# Patient Record
Sex: MALE | Race: WHITE | NOT HISPANIC OR LATINO | Employment: FULL TIME | URBAN - METROPOLITAN AREA
[De-identification: names, ages, dates, MRNs, and addresses within clinical notes are randomized per-mention and may not be internally consistent; named-entity substitution may affect disease eponyms.]

---

## 2018-07-08 ENCOUNTER — APPOINTMENT (EMERGENCY)
Dept: RADIOLOGY | Facility: HOSPITAL | Age: 53
End: 2018-07-08
Payer: COMMERCIAL

## 2018-07-08 ENCOUNTER — HOSPITAL ENCOUNTER (EMERGENCY)
Facility: HOSPITAL | Age: 53
Discharge: HOME | End: 2018-07-08
Attending: EMERGENCY MEDICINE | Admitting: EMERGENCY MEDICINE
Payer: COMMERCIAL

## 2018-07-08 VITALS
SYSTOLIC BLOOD PRESSURE: 143 MMHG | WEIGHT: 190 LBS | DIASTOLIC BLOOD PRESSURE: 69 MMHG | OXYGEN SATURATION: 94 % | HEIGHT: 69 IN | BODY MASS INDEX: 28.14 KG/M2 | TEMPERATURE: 98.5 F | HEART RATE: 82 BPM | RESPIRATION RATE: 18 BRPM

## 2018-07-08 DIAGNOSIS — S06.0X9A CONCUSSION: Primary | ICD-10-CM

## 2018-07-08 LAB
ALBUMIN SERPL BCP-MCNC: 3.5 G/DL (ref 3.5–5)
ALP SERPL-CCNC: 73 U/L (ref 46–116)
ALT SERPL W P-5'-P-CCNC: 53 U/L (ref 12–78)
AMMONIA PLAS-SCNC: 21 UMOL/L (ref 11–35)
ANION GAP SERPL CALCULATED.3IONS-SCNC: 6 MMOL/L (ref 4–13)
APTT PPP: 28 SECONDS (ref 24–36)
AST SERPL W P-5'-P-CCNC: 30 U/L (ref 5–45)
ATRIAL RATE: 75 BPM
BACTERIA UR QL AUTO: NORMAL /HPF
BASOPHILS # BLD AUTO: 0.06 THOUSANDS/ΜL (ref 0–0.1)
BASOPHILS NFR BLD AUTO: 1 % (ref 0–1)
BILIRUB SERPL-MCNC: 0.35 MG/DL (ref 0.2–1)
BILIRUB UR QL STRIP: NEGATIVE
BUN SERPL-MCNC: 7 MG/DL (ref 5–25)
CALCIUM SERPL-MCNC: 8.5 MG/DL (ref 8.3–10.1)
CHLORIDE SERPL-SCNC: 104 MMOL/L (ref 100–108)
CLARITY UR: CLEAR
CO2 SERPL-SCNC: 27 MMOL/L (ref 21–32)
COLOR UR: YELLOW
CREAT SERPL-MCNC: 0.6 MG/DL (ref 0.6–1.3)
EOSINOPHIL # BLD AUTO: 0.16 THOUSAND/ΜL (ref 0–0.61)
EOSINOPHIL NFR BLD AUTO: 2 % (ref 0–6)
ERYTHROCYTE [DISTWIDTH] IN BLOOD BY AUTOMATED COUNT: 12.9 % (ref 11.6–15.1)
ETHANOL SERPL-MCNC: 152 MG/DL (ref 0–3)
GFR SERPL CREATININE-BSD FRML MDRD: 113 ML/MIN/1.73SQ M
GLUCOSE SERPL-MCNC: 76 MG/DL (ref 65–140)
GLUCOSE UR STRIP-MCNC: NEGATIVE MG/DL
HCT VFR BLD AUTO: 48.9 % (ref 36.5–49.3)
HGB BLD-MCNC: 16.7 G/DL (ref 12–17)
HGB UR QL STRIP.AUTO: NEGATIVE
HYALINE CASTS #/AREA URNS LPF: NORMAL /LPF
IMM GRANULOCYTES # BLD AUTO: 0.02 THOUSAND/UL (ref 0–0.2)
IMM GRANULOCYTES NFR BLD AUTO: 0 % (ref 0–2)
INR PPP: 0.99 (ref 0.86–1.17)
KETONES UR STRIP-MCNC: NEGATIVE MG/DL
LEUKOCYTE ESTERASE UR QL STRIP: ABNORMAL
LYMPHOCYTES # BLD AUTO: 2.33 THOUSANDS/ΜL (ref 0.6–4.47)
LYMPHOCYTES NFR BLD AUTO: 26 % (ref 14–44)
MCH RBC QN AUTO: 31.6 PG (ref 26.8–34.3)
MCHC RBC AUTO-ENTMCNC: 34.2 G/DL (ref 31.4–37.4)
MCV RBC AUTO: 92 FL (ref 82–98)
MONOCYTES # BLD AUTO: 1 THOUSAND/ΜL (ref 0.17–1.22)
MONOCYTES NFR BLD AUTO: 11 % (ref 4–12)
NEUTROPHILS # BLD AUTO: 5.33 THOUSANDS/ΜL (ref 1.85–7.62)
NEUTS SEG NFR BLD AUTO: 60 % (ref 43–75)
NITRITE UR QL STRIP: NEGATIVE
NON-SQ EPI CELLS URNS QL MICRO: NORMAL /HPF
NRBC BLD AUTO-RTO: 0 /100 WBCS
P AXIS: 41 DEGREES
PH UR STRIP.AUTO: 5.5 [PH] (ref 4.5–8)
PLATELET # BLD AUTO: 252 THOUSANDS/UL (ref 149–390)
PMV BLD AUTO: 9.8 FL (ref 8.9–12.7)
POTASSIUM SERPL-SCNC: 3.6 MMOL/L (ref 3.5–5.3)
PR INTERVAL: 178 MS
PROT SERPL-MCNC: 7.1 G/DL (ref 6.4–8.2)
PROT UR STRIP-MCNC: NEGATIVE MG/DL
PROTHROMBIN TIME: 13.2 SECONDS (ref 11.8–14.2)
QRS AXIS: -42 DEGREES
QRSD INTERVAL: 94 MS
QT INTERVAL: 362 MS
QTC INTERVAL: 404 MS
RBC # BLD AUTO: 5.29 MILLION/UL (ref 3.88–5.62)
RBC #/AREA URNS AUTO: NORMAL /HPF
SODIUM SERPL-SCNC: 137 MMOL/L (ref 136–145)
SP GR UR STRIP.AUTO: 1.01 (ref 1–1.03)
T WAVE AXIS: 55 DEGREES
UROBILINOGEN UR QL STRIP.AUTO: 0.2 E.U./DL
VENTRICULAR RATE: 75 BPM
WBC # BLD AUTO: 8.9 THOUSAND/UL (ref 4.31–10.16)
WBC #/AREA URNS AUTO: NORMAL /HPF

## 2018-07-08 PROCEDURE — 85610 PROTHROMBIN TIME: CPT | Performed by: EMERGENCY MEDICINE

## 2018-07-08 PROCEDURE — 82140 ASSAY OF AMMONIA: CPT | Performed by: EMERGENCY MEDICINE

## 2018-07-08 PROCEDURE — 85730 THROMBOPLASTIN TIME PARTIAL: CPT | Performed by: EMERGENCY MEDICINE

## 2018-07-08 PROCEDURE — 80320 DRUG SCREEN QUANTALCOHOLS: CPT | Performed by: EMERGENCY MEDICINE

## 2018-07-08 PROCEDURE — 99284 EMERGENCY DEPT VISIT MOD MDM: CPT

## 2018-07-08 PROCEDURE — 93010 ELECTROCARDIOGRAM REPORT: CPT | Performed by: INTERNAL MEDICINE

## 2018-07-08 PROCEDURE — 85025 COMPLETE CBC W/AUTO DIFF WBC: CPT | Performed by: EMERGENCY MEDICINE

## 2018-07-08 PROCEDURE — 36415 COLL VENOUS BLD VENIPUNCTURE: CPT | Performed by: EMERGENCY MEDICINE

## 2018-07-08 PROCEDURE — 80053 COMPREHEN METABOLIC PANEL: CPT | Performed by: EMERGENCY MEDICINE

## 2018-07-08 PROCEDURE — 70450 CT HEAD/BRAIN W/O DYE: CPT

## 2018-07-08 PROCEDURE — 93005 ELECTROCARDIOGRAM TRACING: CPT

## 2018-07-08 PROCEDURE — 81001 URINALYSIS AUTO W/SCOPE: CPT | Performed by: EMERGENCY MEDICINE

## 2018-07-08 NOTE — ED ATTENDING ATTESTATION
Padilla Serrano MD, saw and evaluated the patient  I have discussed the patient with the resident/non-physician practitioner and agree with the resident's/non-physician practitioner's findings, Plan of Care, and MDM as documented in the resident's/non-physician practitioner's note, except where noted  All available labs and Radiology studies were reviewed  At this point I agree with the current assessment done in the Emergency Department  I have conducted an independent evaluation of this patient a history and physical is as follows:      Critical Care Time  CritCare Time    Procedures     51 yo male brought in by girlfriend for periods of confusion, altered ms, difficulty with gripping  Pt had head trauma and hit head on cabinet last week  Pt with no blood thinners  Pt does drink alcohol daily  No other trauma, no other drug use  No cp, no sob, no abdominal pain  Vss, afebrile, lungs cta, rrr, abdomen soft nontender, no neuro deficits  Ct head, labs  Urine

## 2018-07-08 NOTE — ED NOTES
Pt denies blood thinners  Pt states no daily medications  Denies blood thinners  Pt c/o headache since he hit his head last Thursday with associated dizziness and confusion        Leo Allen, ESTHER  07/08/18 4626

## 2018-07-08 NOTE — ED NOTES
Pt reports on 6/28/18 he walked into a piece of wood and hit the front of his head  Per pt's girlfriend pt has been increasingly confused since  Pt states it was unwitnessed and is unsure if he lost consciousness  Pt denies thinners and pt reports not being seen for this until now        Neli Mosqueda RN  07/08/18 8403

## 2018-07-08 NOTE — ED PROVIDER NOTES
History  Chief Complaint   Patient presents with    Head Injury     family states "patient hit his head last thursday " Since head injury patient becoming confused per significant other  52year old male with history alcohol use who presents with head injury  States he hit his head on a doorway on 6/28/2018  He does not remember if he lost consciousness, denies any other injuries at time of event  Patient now comes to ED with complaints of dizziness, headache, and head pressure which began with the head injury and have gradually worsened  Per girlfriend, he has also been exhibiting unusual behavior - he has been more agitated and aggressive, and has exhibited moments of confusion such as not remembering who the president is, or thinking he's in an old vehicle they no longer own  In general they both endorse that he has "not been himself " He states he has had some difficulty picking up small objects since the injury  He has high blood pressure but does not take any medication  He endorses drinking on average 6 beers/day, smokes 1 pack/day, and denies using any drugs  He denies aspirin or blood thinner use, and denies a history of any bleeding disorder  None       History reviewed  No pertinent past medical history  History reviewed  No pertinent surgical history  History reviewed  No pertinent family history  I have reviewed and agree with the history as documented  Social History   Substance Use Topics    Smoking status: Current Every Day Smoker    Smokeless tobacco: Never Used    Alcohol use Yes        Review of Systems   Constitutional: Positive for activity change  Negative for chills, diaphoresis and fever  HENT: Negative for congestion, drooling and sore throat  Eyes: Negative for photophobia and visual disturbance  Respiratory: Negative for chest tightness and shortness of breath  Cardiovascular: Negative for chest pain and palpitations     Gastrointestinal: Negative for abdominal distention, abdominal pain, nausea and vomiting  Endocrine: Negative for polydipsia and polyuria  Genitourinary: Negative for dysuria and hematuria  Musculoskeletal: Negative for arthralgias, back pain, neck pain and neck stiffness  Skin: Negative for color change, pallor and wound  Allergic/Immunologic: Negative for environmental allergies and food allergies  Neurological: Positive for weakness and headaches  Negative for dizziness, facial asymmetry and numbness  Hematological: Negative for adenopathy  Does not bruise/bleed easily  Psychiatric/Behavioral: Positive for agitation, behavioral problems and confusion  Physical Exam  ED Triage Vitals [07/08/18 1459]   Temperature Pulse Respirations Blood Pressure SpO2   98 5 °F (36 9 °C) 89 18 137/77 96 %      Temp Source Heart Rate Source Patient Position - Orthostatic VS BP Location FiO2 (%)   Oral Monitor Sitting Right arm --      Pain Score       8           Orthostatic Vital Signs  Vitals:    07/08/18 1511 07/08/18 1615 07/08/18 1721 07/08/18 1800   BP: 140/74 126/73 132/66 143/69   Pulse: 84 82 91 82   Patient Position - Orthostatic VS:   Lying Lying       Physical Exam   Constitutional: He is oriented to person, place, and time  Vital signs are normal  No distress  HENT:   Head: Normocephalic and atraumatic  Head is without raccoon's eyes, without Waldron's sign, without abrasion, without contusion and without laceration  Eyes: EOM are normal  Pupils are equal, round, and reactive to light  Neck: Normal range of motion  Neck supple  Cardiovascular: Normal rate, regular rhythm, normal heart sounds and intact distal pulses  Exam reveals no gallop and no friction rub  No murmur heard  Pulmonary/Chest: Effort normal and breath sounds normal  No respiratory distress  He has no wheezes  He has no rales  He exhibits no tenderness  Abdominal: Soft  Bowel sounds are normal  He exhibits no distension  There is no tenderness  There is no rebound and no guarding  Musculoskeletal: Normal range of motion  He exhibits no edema, tenderness or deformity  Neurological: He is alert and oriented to person, place, and time  He has normal strength  No cranial nerve deficit or sensory deficit  He exhibits normal muscle tone  He displays a negative Romberg sign  Coordination and gait normal  GCS eye subscore is 4  GCS verbal subscore is 5  GCS motor subscore is 6  Skin: He is not diaphoretic         ED Medications  Medications - No data to display    Diagnostic Studies  Results Reviewed     Procedure Component Value Units Date/Time    Urine Microscopic [[id number]]  (Normal) Collected:  07/08/18 1722    Lab Status:  Final result Specimen:  Urine from Urine, Other Updated:  07/08/18 1747     RBC, UA None Seen /hpf      WBC, UA None Seen /hpf      Epithelial Cells None Seen /hpf      Bacteria, UA None Seen /hpf      Hyaline Casts, UA None Seen /lpf     UA w Reflex to Microscopic [[id number]]  (Abnormal) Collected:  07/08/18 1722    Lab Status:  Final result Specimen:  Urine from Urine, Other Updated:  07/08/18 1745     Color, UA Yellow     Clarity, UA Clear     Specific Gravity, UA 1 010     pH, UA 5 5     Leukocytes, UA Trace (A)     Nitrite, UA Negative     Protein, UA Negative mg/dl      Glucose, UA Negative mg/dl      Ketones, UA Negative mg/dl      Urobilinogen, UA 0 2 E U /dl      Bilirubin, UA Negative     Blood, UA Negative    Comprehensive metabolic panel [[id number]] Collected:  07/08/18 1620    Lab Status:  Final result Specimen:  Blood from Arm, Left Updated:  07/08/18 1653     Sodium 137 mmol/L      Potassium 3 6 mmol/L      Chloride 104 mmol/L      CO2 27 mmol/L      Anion Gap 6 mmol/L      BUN 7 mg/dL      Creatinine 0 60 mg/dL      Glucose 76 mg/dL      Calcium 8 5 mg/dL      AST 30 U/L      ALT 53 U/L      Alkaline Phosphatase 73 U/L      Total Protein 7 1 g/dL      Albumin 3 5 g/dL      Total Bilirubin 0 35 mg/dL      eGFR 113 ml/min/1 73sq m     Narrative:         National Kidney Disease Education Program recommendations are as follows:  GFR calculation is accurate only with a steady state creatinine  Chronic Kidney disease less than 60 ml/min/1 73 sq  meters  Kidney failure less than 15 ml/min/1 73 sq  meters      Ethanol [[id number]]  (Abnormal) Collected:  07/08/18 1620    Lab Status:  Final result Specimen:  Blood from Arm, Left Updated:  07/08/18 1653     Ethanol Lvl 152 (H) mg/dL     Protime-INR [[id number]]  (Normal) Collected:  07/08/18 1620    Lab Status:  Final result Specimen:  Blood from Arm, Left Updated:  07/08/18 1648     Protime 13 2 seconds      INR 0 99    APTT [[id number]]  (Normal) Collected:  07/08/18 1620    Lab Status:  Final result Specimen:  Blood from Arm, Left Updated:  07/08/18 1648     PTT 28 seconds     Ammonia [[id number]]  (Normal) Collected:  07/08/18 1620    Lab Status:  Final result Specimen:  Blood from Arm, Left Updated:  07/08/18 1648     Ammonia 21 umol/L     CBC and differential [[id number]] Collected:  07/08/18 1620    Lab Status:  Final result Specimen:  Blood from Arm, Left Updated:  07/08/18 1635     WBC 8 90 Thousand/uL      RBC 5 29 Million/uL      Hemoglobin 16 7 g/dL      Hematocrit 48 9 %      MCV 92 fL      MCH 31 6 pg      MCHC 34 2 g/dL      RDW 12 9 %      MPV 9 8 fL      Platelets 407 Thousands/uL      nRBC 0 /100 WBCs      Neutrophils Relative 60 %      Immat GRANS % 0 %      Lymphocytes Relative 26 %      Monocytes Relative 11 %      Eosinophils Relative 2 %      Basophils Relative 1 %      Neutrophils Absolute 5 33 Thousands/µL      Immature Grans Absolute 0 02 Thousand/uL      Lymphocytes Absolute 2 33 Thousands/µL      Monocytes Absolute 1 00 Thousand/µL      Eosinophils Absolute 0 16 Thousand/µL      Basophils Absolute 0 06 Thousands/µL                  CT head without contrast   ED Interpretation by Coleen Joiner MD (07/08 1654)   On re-exam of images, possible posterior hemorrhage with old blood  Will wait for final read from radiology  Final Result by Jessica Schwarz MD (07/08 1710)      No acute intracranial abnormality  Workstation performed: [id number]               Procedures  Procedures      Phone Consults  ED Phone Contact    ED Course  ED Course as of Jul 09 1440   Sun Jul 08, 2018   1701 Hemoglobin: 16 7   1701 PTT: 28   1701 Protime: 13 2   1701 INR: 0 99   1701 WBC: 8 90   1701 Sodium: 137   1702 ALT: 53   1702 AST: 30   1702 Albumin: 3 5   1710 CT head without contrast   1710 CT head without contrast   1721 CT head without contrast   1721 Final CT read by radiologist states no evidence of intracranial hemorrhage  CT head without contrast   1726 Head CT is negative and reassuring that neurologic exam is normal  Patient's symptoms are likely the result of a concussion  Counseled patient on concussion care and to follow-up as outpatient with family care provider if symptoms persist                                 MDM  Number of Diagnoses or Management Options  Diagnosis management comments: Progressive headaches, dizziness, and confusion s/p head trauma is concerning for slow intracranial hemorrhage  EtOH use also places patient at higher risk for subdural  No obvious deficits appreciated on neuro exam, but history is still concerning and will therefore conduct head CT  We will also draw basic bloodwork for other causes of confusion  Will check CBC for anemia and infection  Will check LFT's and ammonia to evaluate liver function, and a high ammonia level could contribute to confusion  Will obtain UA to rule out UTI which can be common cause of delirium  Will check clotting factors given possible head bleed and possible liver issues  Please refer to ED course for further comments on workup and disposition      CritCare Time    Disposition  Final diagnoses:   Concussion     Time reflects when diagnosis was documented in both MDM as applicable and the Disposition within this note     Time User Action Codes Description Comment    7/8/2018  5:16 PM Becca Keshia Mnedoza Add [[id number]] Concussion       ED Disposition     ED Disposition Condition Comment    Discharge  Mattie Mederos discharge to home/self care  Condition at discharge: Stable        Follow-up Information     Follow up With Specialties Details Why Alicia Cruz   Make appointment with PCP if symptoms persist or worsen  [phone]            There are no discharge medications for this patient  No discharge procedures on file  ED Provider  Attending physically available and evaluated Mattie Mederos  I managed the patient along with the ED Attending      Electronically Signed by         Tiffany Smyth MD  07/09/18 5350

## 2018-07-08 NOTE — DISCHARGE INSTRUCTIONS
Concussion   WHAT YOU NEED TO KNOW:   A concussion is a mild brain injury  It is usually caused by a bump or blow to the head from a fall, a motor vehicle crash, or a sports injury  Sometimes being shaken forcefully may cause a concussion  DISCHARGE INSTRUCTIONS:   Have someone else call 911 for the following:   · Someone tries to wake you and cannot do so  · You have a seizure, increasing confusion, or a change in personality  · Your speech becomes slurred, or you have new vision problems  Return to the emergency department if:   · You have a severe headache that does not go away  · You have arm or leg weakness, numbness, or new problems with coordination  · You have blood or clear fluid coming out of the ears or nose  Contact your healthcare provider if:   · You have nausea or are vomiting  · You feel more sleepy than usual     · Your symptoms get worse  · Your symptoms last longer than 6 weeks after the injury  · You have questions or concerns about your condition or care  Medicines:   · Acetaminophen  helps to decrease pain  It is available without a doctor's order  Ask how much to take and how often to take it  Follow directions  Acetaminophen can cause liver damage if not taken correctly  · NSAIDs , such as ibuprofen, help decrease swelling and pain  NSAIDs can cause stomach bleeding or kidney problems in certain people  If you take blood thinner medicine, always ask your healthcare provider if NSAIDs are safe for you  Always read the medicine label and follow directions  · Take your medicine as directed  Contact your healthcare provider if you think your medicine is not helping or if you have side effects  Tell him or her if you are allergic to any medicine  Keep a list of the medicines, vitamins, and herbs you take  Include the amounts, and when and why you take them  Bring the list or the pill bottles to follow-up visits   Carry your medicine list with you in case of an emergency  Follow up with your healthcare provider as directed:  Write down your questions so you remember to ask them during your visits  Self-care:   · Rest  from physical and mental activities as directed  Mental activities are those that require thinking, concentration, and attention  You will need to rest until your symptoms are gone  Rest will allow you to recover from your concussion  Ask your healthcare provider when you can return to work and other daily activities  · Have someone stay with you for the first 24 hours after your injury  Your healthcare provider should be contacted if your symptoms get worse, or you develop new symptoms  · Do not participate in sports and physical activities until your healthcare provider says it is okay  They could make your symptoms worse or lead to another concussion  Your healthcare provider will tell you when it is okay for you to return to sports or physical activities  Prevent another concussion:   · Wear protective sports equipment that fit properly  Helmets help decrease your risk of a serious brain injury  Talk to your healthcare provider about ways you can decrease your risk for a concussion if you play sports  · Wear your seat belt  every time you travel  This helps to decrease your risk of a head injury if you are in a car accident  © 2017 [employer] [facility] Information is for End User's use only and may not be sold, redistributed or otherwise used for commercial purposes  All illustrations and images included in CareNotes® are the copyrighted property of [employer]  or [employer]  The above information is an  only  It is not intended as medical advice for individual conditions or treatments  Talk to your doctor, nurse or pharmacist before following any medical regimen to see if it is safe and effective for you